# Patient Record
Sex: MALE | Race: OTHER | Employment: FULL TIME | URBAN - METROPOLITAN AREA
[De-identification: names, ages, dates, MRNs, and addresses within clinical notes are randomized per-mention and may not be internally consistent; named-entity substitution may affect disease eponyms.]

---

## 2019-04-24 ENCOUNTER — APPOINTMENT (OUTPATIENT)
Dept: ULTRASOUND IMAGING | Age: 44
End: 2019-04-24

## 2019-04-24 ENCOUNTER — HOSPITAL ENCOUNTER (EMERGENCY)
Age: 44
Discharge: HOME | End: 2019-04-24
Attending: EMERGENCY MEDICINE

## 2019-04-24 ENCOUNTER — APPOINTMENT (OUTPATIENT)
Dept: GENERAL RADIOLOGY | Age: 44
End: 2019-04-24

## 2019-04-24 VITALS
WEIGHT: 165 LBS | RESPIRATION RATE: 14 BRPM | BODY MASS INDEX: 27.49 KG/M2 | HEIGHT: 65 IN | HEART RATE: 91 BPM | SYSTOLIC BLOOD PRESSURE: 113 MMHG | TEMPERATURE: 98.2 F | DIASTOLIC BLOOD PRESSURE: 80 MMHG | OXYGEN SATURATION: 97 %

## 2019-04-24 DIAGNOSIS — S80.02XA CONTUSION OF LEFT KNEE, INITIAL ENCOUNTER: ICD-10-CM

## 2019-04-24 DIAGNOSIS — S83.90XA SPRAIN OF KNEE, UNSPECIFIED LATERALITY, UNSPECIFIED LIGAMENT, INITIAL ENCOUNTER: Primary | ICD-10-CM

## 2019-04-24 PROCEDURE — 93971 EXTREMITY STUDY: CPT

## 2019-04-24 PROCEDURE — 99283 EMERGENCY DEPT VISIT LOW MDM: CPT

## 2019-04-24 PROCEDURE — 73562 X-RAY EXAM OF KNEE 3: CPT

## 2019-04-24 PROCEDURE — 73590 X-RAY EXAM OF LOWER LEG: CPT

## 2019-04-24 ASSESSMENT — PAIN DESCRIPTION - PAIN TYPE: TYPE: ACUTE PAIN

## 2019-04-24 ASSESSMENT — PAIN DESCRIPTION - LOCATION: LOCATION: KNEE;LEG

## 2019-04-24 ASSESSMENT — PAIN DESCRIPTION - ORIENTATION: ORIENTATION: LEFT

## 2019-04-24 ASSESSMENT — PAIN SCALES - GENERAL: PAINLEVEL_OUTOF10: 5

## 2019-04-25 NOTE — ED PROVIDER NOTES
[facility] ED  [address] [facility] [address]  Phone: [phone]  Fax: [phone]      Attending Physician Attestation    I performed a history and physical examination of the patient and discussed management with the mid level provider. I reviewed the mid level provider's note and agree with the documented findings and plan of care. Any areas of disagreement are noted on the chart. I was personally present for the key portions of any procedures. I have documented in the chart those procedures where I was not present during the key portions. I have reviewed the emergency nurses triage note. I agree with the chief complaint, past medical history, past surgical history, allergies, medications, social and family history as documented unless otherwise noted below. Documentation of the HPI, Physical Exam and Medical Decision Making performed by mid level providers is based on my personal performance of the HPI, PE and MDM. For Physician Assistant/ Nurse Practitioner cases/documentation I have personally evaluated this patient and have completed at least one if not all key elements of the E/M (history, physical exam, and MDM). Additional findings are as noted. CHIEF COMPLAINT       Chief Complaint   Patient presents with    Leg Pain     fell last wednesday and hit left leg on mower trailer still painful    Knee Pain         HISTORY OF PRESENT ILLNESS    Patti Delvalle is a 41 y.o. male who presents with left lower leg, knee and ankle pain after falling off a truck. He relates to happen last week and he really didn't think much of it at that time but pain is still there. He also relates that he was talking to a friend who got him concerned that it could be a DVT as well. PAST MEDICAL HISTORY    has no past medical history on file. SURGICAL HISTORY      has no past surgical history on file.     CURRENT MEDICATIONS       Previous Medications    No medications on file No evidence of DVT in the left lower extremity. Subcutaneous edema within the area of swelling. LABS:  No results found for this visit on 04/24/19. EMERGENCY DEPARTMENT COURSE:   Vitals:    Vitals:    04/24/19 1842   BP: 113/80   Pulse: 91   Resp: 14   Temp: 98.2 °F (36.8 °C)   TempSrc: Oral   SpO2: 97%   Weight: 74.8 kg (165 lb)   Height: 5' 5\" (1.651 m)     -------------------------  BP: 113/80, Temp: 98.2 °F (36.8 °C), Pulse: 91, Resp: 14      PERTINENT ATTENDING PHYSICIAN COMMENTS:    X-rays have been negative. Will await Doppler studies as well. (Please note that portions of this note were completed with a voice recognition program.  Efforts were made to edit the dictations but occasionally words are mis-transcribed.)    Vizcaino MD, F.A.C.E.P.   Attending Emergency Medicine Physician        Teresa Membreno MD  04/25/19 0159

## 2019-04-25 NOTE — ED PROVIDER NOTES
[facility] ED  [address] [place] [address]  Phone: [phone]  Fax: [phone]      eMERGENCY dEPARTMENT eNCOUnter      Pt Name: Anuja Espino  MRN: [id number]  [place] 1975  Date of evaluation: 4/24/19      CHIEF COMPLAINT:  Chief Complaint   Patient presents with    Leg Pain     fell last wednesday and hit left leg on mower trailer still painful    Knee Pain       HISTORY OF PRESENT ILLNESS    Anuja Espino is a 41 y.o. male who presents with evaluation for orthopedic pain:    Location/Symptom:   L knee/lower leg pain  Timing/Onset:   7 days  Context/Setting:    Pt was up on his trailer railing and he slipped and fell, hitting his knee on the way down. He landed on right leg and denies any twisting of left knee. he reports this level of swelling if better but he has noted development of bruising on this thigh/shin/lower leg. He has been working but with pain. No paresthesias/focal weakness. Quality:   Achy, sharp  Duration:   constant  Modifying Factors: Worse with movement and weightbearing, better with rest  Severity:   Moderate    Nursing Notes were reviewed. REVIEW OF SYSTEMS       Constitutional: Denies recent fever, chills. Eyes: No vision changes. Neck: No neck pain. Respiratory: Denies recent shortness of breath. Cardiac:  Denies recent chest pain. GI:  Denies abdominal pain/nausea/vomiting/diarrhea. : Denies dysuria. Musculoskeletal:   Per HPI  Neurologic:  No headache. No focal weakness. No paresthesias. Skin:  Denies any rash. Negative in 10 essential Systems except as mentioned above and in the HPI. PAST MEDICAL HISTORY   PMH:  has no past medical history on file. Surgical History:  has no past surgical history on file. Social History:  reports that he has been smoking. He has never used smokeless tobacco. He reports that he drinks alcohol. He reports that he has current or past drug history. Drug: Marijuana.   Family History: None  Psychiatric History: None    Allergies:has No Known Allergies. PHYSICAL EXAM     INITIAL VITALS: /80   Pulse 91   Temp 98.2 °F (36.8 °C) (Oral)   Resp 14   Ht 5' 5\" (1.651 m)   Wt 74.8 kg (165 lb)   SpO2 97%   BMI 27.46 kg/m²   Constitutional:  Well developed   Eyes:  Pupils equal/round  HENT:  Atraumatic, external ears normal, nose normal  Respiratory:  LCTA bilat, no W/R/R  Cardiovascular:  RRR with normal S1 and S2    Musculoskeletal:  Ecchymotic edema of medial knee joint/prox fib and with older appearing ecchymosis of distal medial thigh that extends down to med/post/lat knee and medial aspect of lower tib-fib. No ankle or foot TTP. NV intact distally. Back:  No midline or CVA tenderness. Normal to inspection. Integument:   No rash. Neurologic:  Alert & appropriate mentation/interaction, no focal deficits noted       DIAGNOSTIC RESULTS     EKG: All EKG's are interpreted by the Emergency Department Physician who either signs or Co-signs this chart in the absence of a cardiologist.  Not indicated    RADIOLOGY:   Reviewed the radiologist:  US DUP LOWER EXTREMITY LEFT HARRY   Final Result   No evidence of DVT in the left lower extremity. Subcutaneous edema within the area of swelling. XR KNEE LEFT (3 VIEWS)   Final Result   No acute osseous abnormality of the left knee, left tibia, or left fibula         XR TIBIA FIBULA LEFT (2 VIEWS)   Final Result   No acute osseous abnormality of the left knee, left tibia, or left fibula                 LABS:  Labs Reviewed - No data to display  Not indicated    EMERGENCY DEPARTMENT COURSE / MDM:       1930  XR and Doppler ordered. His primary concern was a DVT. I'm more concerned about mild fracture. With this level of ecchymosis and traumatic mechanism its reasonable to rule out DVT. 2055    Attending to complete all remaining care, diagnosis and disposition for this patient.   We discussed this patient prior to my departure. My note will be refreshed to reflect these details, though I was not actively involved in this patient's care following the time stamp above. No orders of the defined types were placed in this encounter. CONSULTS:  None      FINAL IMPRESSION      1. Sprain of knee, unspecified laterality, unspecified ligament, initial encounter    2. Contusion of left knee, initial encounter          DISPOSITION/PLAN:  DISPOSITION          PATIENT REFERRED TO:  No follow-up provider specified.     DISCHARGE MEDICATIONS:  Discharge Medication List as of 4/24/2019  9:48 PM          (Please note that portions of this note were completed with a voice recognition program.  Efforts were made to edit the dictations but occasionally words are mis-transcribed.)    SUMMER Mayorga PA-C  04/25/19 2007